# Patient Record
(demographics unavailable — no encounter records)

---

## 2025-07-27 NOTE — PHYSICAL EXAM
[TextEntry] : General: alert, well appearing, no distress HEENT: no hair thinning or alopecia, clear conjunctiva, no oral or nasal ulcers, no cervical lymphadenopathy Cardiac: S1+, S2+,normal rate and rhythm, no murmur, rubs or gallops Pulm: normal respiratory effort, clear to auscultation bilaterally GI: abdomen soft, non-tender and non-distended   MSK: Hand-DIP joints, PIP joints, MCP joints, CMC joints without evidence of synovitis or effusion. Intact and nonpainful range of motion. No [place]/Monica nodes. Wrist, elbow, and shoulder joints without evidence of synovitis or effusion. Intact and nonpainful range of motion. Foot/ankle/knee/hip joints without erythema/effusion/tenderness to palpation and with intact nonpainful range of motion. Spine: normal appearance, no tenderness, normal ROM   Skin: No rashes, warm and well-perfused. No nail pitting, digital ulceration, dactylitis, or telangiectasias. No subcutaneous nodules.

## 2025-07-27 NOTE — HISTORY OF PRESENT ILLNESS
[FreeTextEntry1] : THIS IS PRECHARTING: I HAVE NOT SEEN THE PATIENT.  73yo F with PMHx of  presents with complaints of RA and FM.

## 2025-07-27 NOTE — REVIEW OF SYSTEMS
[TextEntry] :   Head: No Alopecia, no scalp pain, no temporal headaches, no hearing loss, no red/painful eyes, no dry eyes ,no dry mouth, no painless oral ulcers,no poor dentition,no jaw claudication ENT: No enlarged lymph nodes, no parotid swelling,no dysphagia CVS: No Positional chest pain Pulm: No SOB, no cough, no hemoptysis, no pleuritic chest pain Abdomen:  No constipation,no diarrhea, no postprandial pain Skin: No rash,no dry skin,no tight skin,no nodules, no Raynaud's MSK: no joint swelling,no morning stiffness, no back pain :  no blood in urine Heme: No clots,no hx of low WBC,no hx of low Hb,no hx of low platelets Neuro: No tingling,no numbness,no foot drop, no weakness, no seizures, no temporal headaches Systemic: No fevers, no weight loss, no night sweats No H/o radiation therapy, no recent hospitalization